# Patient Record
Sex: MALE | Race: WHITE | NOT HISPANIC OR LATINO | Employment: UNEMPLOYED | URBAN - METROPOLITAN AREA
[De-identification: names, ages, dates, MRNs, and addresses within clinical notes are randomized per-mention and may not be internally consistent; named-entity substitution may affect disease eponyms.]

---

## 2019-08-13 PROBLEM — S32.402D CLOSED DISPLACED FRACTURE OF LEFT ACETABULUM WITH ROUTINE HEALING: Status: ACTIVE | Noted: 2019-08-13

## 2019-08-13 PROBLEM — M89.8X9 HETEROTOPIC OSSIFICATION: Status: ACTIVE | Noted: 2019-08-13

## 2019-10-09 PROBLEM — R49.0 HOARSENESS OF VOICE: Status: ACTIVE | Noted: 2019-10-09

## 2019-10-09 PROBLEM — M25.552 LEFT HIP PAIN: Status: ACTIVE | Noted: 2019-10-09

## 2019-10-09 PROBLEM — G89.29 CHRONIC LEFT SHOULDER PAIN: Status: ACTIVE | Noted: 2019-10-09

## 2019-10-09 PROBLEM — M25.512 CHRONIC LEFT SHOULDER PAIN: Status: ACTIVE | Noted: 2019-10-09

## 2019-11-01 ENCOUNTER — NURSE TRIAGE (OUTPATIENT)
Dept: ADMINISTRATIVE | Facility: CLINIC | Age: 54
End: 2019-11-01

## 2019-11-01 NOTE — TELEPHONE ENCOUNTER
Pt needs a walker, his rx has . They had difficulty finding a DME company that would deliver to their area.   [facility] is the agency that will deliver to them, but the rx has since  and they also need supportive documentation to send in to the insurance to get them to approve the walker.  [phone],  fax #[phone], Padmini is the person they have been communicating with at [facility].  Caller states both they and Padmini have reached out to Dr. Woods' office several times with no response. Caller states pt is having difficulty with ambulation and has falls occasionally and has been waiting a long time and really needs this walker.  Attempted to reach Dr. Woods' office, no answer, will send a high priority message.  Advised caller if no response, to please call back.    Reason for Disposition   [1] Follow-up call from patient regarding patient's clinical status AND [2] information urgent    Additional Information   Negative: Lab calling with strep throat test results and triager can call in prescription   Negative: Lab calling with urinalysis test results and triager can call in prescription   Negative: Medication questions   Negative: ED call to PCP   Negative: Physician call to PCP   Negative: Call about patient who is currently hospitalized   Negative: Lab or radiology calling with CRITICAL test results   Negative: [1] Prescription not at pharmacy AND [2] was prescribed today by PCP   Negative: [1] Caller requests to speak ONLY to PCP AND [2] urgent question   Negative: [1] Caller requests to speak to PCP now AND [2] won't tell us reason for call  (Exception: if 10 pm to 6 am, caller must first discuss reason for the call)   Negative: Notification of hospital admission   Negative: Notification of death    Protocols used: PCP CALL - NO TRIAGE-A-

## 2020-02-13 PROBLEM — S42.402D CLOSED FRACTURE OF DISTAL END OF LEFT HUMERUS WITH ROUTINE HEALING: Status: ACTIVE | Noted: 2020-02-13

## 2021-05-12 ENCOUNTER — PATIENT MESSAGE (OUTPATIENT)
Dept: RESEARCH | Facility: HOSPITAL | Age: 56
End: 2021-05-12